# Patient Record
(demographics unavailable — no encounter records)

---

## 2025-07-02 NOTE — HISTORY OF PRESENT ILLNESS
[FreeTextEntry1] : Follow-up [de-identified] : HLD follow-up Left wrist pain  suspected lipoma of wrist as well.  referral to hand surgeon if pain does not resolve with supportive care measures.

## 2025-07-02 NOTE — ASSESSMENT
[FreeTextEntry1] : , , Wrist Tendonitis: supportive care measures discussed.    HLD: LDL = 118 Counseled on diet, exercise and lifestyle modifications. Advised a diet centered around whole plant based foods.  Vegetables, fruits, legumes, grains, nuts.  Advised limiting intake of refined processed foods (refined white flour products, refined sugar, soda, juice).  Limit intake of meat, dairy, eggs, oil.  BMI = 28 Counseling on diet, exercise and lifestyle modifications. Weight loss goals discussed Nutritionist sophie   MU Suspected: Discussed conducting Sleep studies / Pulmonology referral.  Anxiety: Exercise Counseling discussed med options inf not improving